# Patient Record
Sex: FEMALE | URBAN - METROPOLITAN AREA
[De-identification: names, ages, dates, MRNs, and addresses within clinical notes are randomized per-mention and may not be internally consistent; named-entity substitution may affect disease eponyms.]

---

## 2023-11-13 ENCOUNTER — LAB OUTSIDE AN ENCOUNTER (OUTPATIENT)
Dept: URBAN - METROPOLITAN AREA CLINIC 88 | Facility: CLINIC | Age: 68
End: 2023-11-13

## 2023-11-13 ENCOUNTER — OFFICE VISIT (OUTPATIENT)
Dept: URBAN - METROPOLITAN AREA CLINIC 88 | Facility: CLINIC | Age: 68
End: 2023-11-13
Payer: MEDICARE

## 2023-11-13 VITALS
DIASTOLIC BLOOD PRESSURE: 87 MMHG | TEMPERATURE: 96.8 F | SYSTOLIC BLOOD PRESSURE: 138 MMHG | HEIGHT: 61 IN | WEIGHT: 128.8 LBS | BODY MASS INDEX: 24.32 KG/M2 | HEART RATE: 72 BPM | OXYGEN SATURATION: 99 %

## 2023-11-13 DIAGNOSIS — K59.01 CONSTIPATION: ICD-10-CM

## 2023-11-13 DIAGNOSIS — K86.9 PANCREATIC LESION: ICD-10-CM

## 2023-11-13 DIAGNOSIS — R10.30 LOWER ABDOMINAL PAIN: ICD-10-CM

## 2023-11-13 DIAGNOSIS — R93.5 ABNORMAL CT OF THE ABDOMEN: ICD-10-CM

## 2023-11-13 PROBLEM — 35298007: Status: ACTIVE | Noted: 2023-11-13

## 2023-11-13 PROCEDURE — 99204 OFFICE O/P NEW MOD 45 MIN: CPT | Performed by: NURSE PRACTITIONER

## 2023-11-13 RX ORDER — ASPIRIN 81 MG/1
1 TABLET TABLET, COATED ORAL ONCE A DAY
Status: ACTIVE | COMMUNITY

## 2023-11-13 RX ORDER — BUPROPION HYDROCHLORIDE 300 MG/1
1 TABLET IN THE MORNING TABLET, EXTENDED RELEASE ORAL ONCE A DAY
Status: ACTIVE | COMMUNITY

## 2023-11-13 RX ORDER — LISINOPRIL 5 MG/1
1 TABLET TABLET ORAL ONCE A DAY
Status: ACTIVE | COMMUNITY

## 2023-11-13 RX ORDER — ATORVASTATIN CALCIUM 20 MG/1
1 TABLET TABLET, FILM COATED ORAL ONCE A DAY
Status: ACTIVE | COMMUNITY

## 2023-11-13 RX ORDER — METFORMIN HYDROCHLORIDE 500 MG/1
1 TABLET WITH A MEAL TABLET, FILM COATED ORAL ONCE A DAY
Status: ACTIVE | COMMUNITY

## 2023-11-13 RX ORDER — UBIQUINOL 100 MG
AS DIRECTED CAPSULE ORAL
Status: ACTIVE | COMMUNITY

## 2023-11-13 NOTE — HPI-TODAY'S VISIT:
Patient presents today for evaluation of abdominal pain.  Started to experience constant lower abdominal and back pain that started over 6 months ago.  Describes this pain as a tightness that appears to be worse in the mornings.  Experiences intermittent episodes of nausea, bloating, flatulence, diarrhea, intermittent dysphagia and reflux.  Describes her dysphagia as being sensation of food becoming lodged in mid-chest. Belching, drinking a carbonated beverage, ginger tea or taking antacid medication as needed helps to lessen symptoms.  Denies weight loss.  Aggravating factors:  certain foods and drinks (coffee, alcoholic beverages).  Alleviating:  dietary changes, takes anti-diarrhea medication as needed during diarrhea episodes.   Typically defecation occurs 1-2 times day after drinking morning coffee.  May not achive a complete sense of evacuation of stool with every BM.     CT A/P 10/20/2023:  pancreatic head with soft itssue focus at ampulla measuring 6 mm with mild diffuse promience of pancreatic duct and moderate colonic stool.  Other findings include normal liver, biliary duct and no ascites was observed.  Denies signs of jaundice, family hx of pancreatic cancer or weight loss.    Scheduled to undergo LT mastectomy next month (12/08/2023) due to genetic testing and family hx.

## 2023-11-22 ENCOUNTER — TELEPHONE ENCOUNTER (OUTPATIENT)
Dept: URBAN - METROPOLITAN AREA CLINIC 88 | Facility: CLINIC | Age: 68
End: 2023-11-22

## 2023-11-28 ENCOUNTER — LAB OUTSIDE AN ENCOUNTER (OUTPATIENT)
Dept: URBAN - METROPOLITAN AREA CLINIC 70 | Facility: CLINIC | Age: 68
End: 2023-11-28

## 2023-11-29 LAB
A/G RATIO: 1.8
ABSOLUTE BASOPHILS: 81
ABSOLUTE EOSINOPHILS: 171
ABSOLUTE LYMPHOCYTES: 1895
ABSOLUTE MONOCYTES: 432
ABSOLUTE NEUTROPHILS: 1922
ALBUMIN: 4.5
ALKALINE PHOSPHATASE: 84
ALT (SGPT): 18
AMYLASE: 83
AST (SGOT): 17
BASOPHILS: 1.8
BILIRUBIN, TOTAL: 0.7
BUN/CREATININE RATIO: (no result)
BUN: 15
CA 19-9: 33
CALCIUM: 9.6
CARBON DIOXIDE, TOTAL: 28
CHLORIDE: 106
CREATININE: 0.79
EGFR: 81
EOSINOPHILS: 3.8
GLOBULIN, TOTAL: 2.5
GLUCOSE: 88
HEMATOCRIT: 42.2
HEMOGLOBIN: 13.8
INR: 1.1
LIPASE: 34
LYMPHOCYTES: 42.1
MCH: 29.7
MCHC: 32.7
MCV: 90.9
MONOCYTES: 9.6
MPV: 9.2
NEUTROPHILS: 42.7
PLATELET COUNT: 300
POTASSIUM: 4.4
PROTEIN, TOTAL: 7
PT: 11.3
RDW: 13.4
RED BLOOD CELL COUNT: 4.64
SODIUM: 142
TSH W/REFLEX TO FT4: 2.99
WHITE BLOOD CELL COUNT: 4.5

## 2023-12-04 ENCOUNTER — OFFICE VISIT (OUTPATIENT)
Dept: URBAN - METROPOLITAN AREA CLINIC 88 | Facility: CLINIC | Age: 68
End: 2023-12-04

## 2023-12-04 RX ORDER — ASPIRIN 81 MG/1
1 TABLET TABLET, COATED ORAL ONCE A DAY
Status: ACTIVE | COMMUNITY

## 2023-12-04 RX ORDER — BUPROPION HYDROCHLORIDE 300 MG/1
1 TABLET IN THE MORNING TABLET, EXTENDED RELEASE ORAL ONCE A DAY
Status: ACTIVE | COMMUNITY

## 2023-12-04 RX ORDER — UBIQUINOL 100 MG
AS DIRECTED CAPSULE ORAL
Status: ACTIVE | COMMUNITY

## 2023-12-04 RX ORDER — ATORVASTATIN CALCIUM 20 MG/1
1 TABLET TABLET, FILM COATED ORAL ONCE A DAY
Status: ACTIVE | COMMUNITY

## 2023-12-04 RX ORDER — LISINOPRIL 5 MG/1
1 TABLET TABLET ORAL ONCE A DAY
Status: ACTIVE | COMMUNITY

## 2023-12-04 RX ORDER — METFORMIN HYDROCHLORIDE 500 MG/1
1 TABLET WITH A MEAL TABLET, FILM COATED ORAL ONCE A DAY
Status: ACTIVE | COMMUNITY

## 2023-12-05 ENCOUNTER — TELEPHONE ENCOUNTER (OUTPATIENT)
Dept: URBAN - METROPOLITAN AREA CLINIC 70 | Facility: CLINIC | Age: 68
End: 2023-12-05

## 2023-12-05 ENCOUNTER — LAB OUTSIDE AN ENCOUNTER (OUTPATIENT)
Dept: URBAN - METROPOLITAN AREA CLINIC 70 | Facility: CLINIC | Age: 68
End: 2023-12-05

## 2023-12-13 ENCOUNTER — DASHBOARD ENCOUNTERS (OUTPATIENT)
Age: 68
End: 2023-12-13

## 2023-12-13 ENCOUNTER — OFFICE VISIT (OUTPATIENT)
Dept: URBAN - METROPOLITAN AREA CLINIC 88 | Facility: CLINIC | Age: 68
End: 2023-12-13
Payer: MEDICARE

## 2023-12-13 VITALS
HEIGHT: 60 IN | WEIGHT: 129.8 LBS | BODY MASS INDEX: 25.48 KG/M2 | OXYGEN SATURATION: 97 % | SYSTOLIC BLOOD PRESSURE: 137 MMHG | TEMPERATURE: 98.1 F | DIASTOLIC BLOOD PRESSURE: 76 MMHG | HEART RATE: 80 BPM

## 2023-12-13 DIAGNOSIS — R93.5 ABNORMAL ABDOMINAL MRI: ICD-10-CM

## 2023-12-13 DIAGNOSIS — K86.89 PANCREATIC DUCT DILATED: ICD-10-CM

## 2023-12-13 DIAGNOSIS — K59.09 OTHER CONSTIPATION: ICD-10-CM

## 2023-12-13 DIAGNOSIS — Z80.0 FAMILY HISTORY OF COLON CANCER: ICD-10-CM

## 2023-12-13 PROCEDURE — 99214 OFFICE O/P EST MOD 30 MIN: CPT | Performed by: NURSE PRACTITIONER

## 2023-12-13 RX ORDER — METFORMIN HYDROCHLORIDE 500 MG/1
1 TABLET WITH A MEAL TABLET, FILM COATED ORAL ONCE A DAY
Status: ACTIVE | COMMUNITY

## 2023-12-13 RX ORDER — UBIQUINOL 100 MG
AS DIRECTED CAPSULE ORAL
Status: ACTIVE | COMMUNITY

## 2023-12-13 RX ORDER — BUPROPION HYDROCHLORIDE 300 MG/1
1 TABLET IN THE MORNING TABLET, EXTENDED RELEASE ORAL ONCE A DAY
Status: ACTIVE | COMMUNITY

## 2023-12-13 RX ORDER — ASPIRIN 81 MG/1
1 TABLET TABLET, COATED ORAL ONCE A DAY
Status: ACTIVE | COMMUNITY

## 2023-12-13 RX ORDER — LISINOPRIL 5 MG/1
1 TABLET TABLET ORAL ONCE A DAY
Status: ACTIVE | COMMUNITY

## 2023-12-13 RX ORDER — ATORVASTATIN CALCIUM 20 MG/1
1 TABLET TABLET, FILM COATED ORAL ONCE A DAY
Status: ACTIVE | COMMUNITY

## 2023-12-13 RX ORDER — CEPHALEXIN 500 MG/1
1 CAPSULE CAPSULE ORAL
Status: ACTIVE | COMMUNITY

## 2023-12-13 RX ORDER — TRAMADOL HYDROCHLORIDE 50 MG/1
1 TABLET AS NEEDED TABLET, FILM COATED ORAL ONCE A DAY
Status: ACTIVE | COMMUNITY

## 2023-12-13 NOTE — HPI-TODAY'S VISIT:
Presents today for follow up regarding abdominal pain and to discuss MRI Abdomen results.  MRI Abd w/wo ordered due to abnormal CT A/P findings concerning for pancreatic mass.  MRI on 11/28/2023 revealed no discrete pancreatic mass.  Borderline prominence of main pancreatic duct in head near ampulla measuring 4 mm.  FIndings concerning for ansa pancreatica.  CT A/P 10/20/2023:  pancreatic head with soft itssue focus at ampulla measuring 6 mm with mild diffuse promience of pancreatic duct and moderate colonic stool.  Other findings include normal liver, biliary duct and no ascites was observed.  Denies signs of jaundice, family hx of pancreatic cancer or weight loss.  She is currently scheduled for EUS procedure on 01/12/2023 at [facility] to further evaluate above findings.  Feels lower abdominal pain is less but still present.  Underwent left mastectomy on 12/01/2023 due to genetic testing and family hx of breast cancer.  Since surgery, reports onset of constipation and abdominal bloating.  Fails to experience a complete sense of evacuation of stool.  Denies taking medication to avoid constipation.

## 2023-12-13 NOTE — PHYSICAL EXAM GASTROINTESTINAL
Abdomen, soft, nontender, slightly protruding ABRAHAM drain in place, no guarding or rigidity, no masses palpable, normal bowel sounds, Liver and Spleen:  no hepatosplenomegaly

## 2023-12-13 NOTE — HPI-OTHER HISTORIES
----------------------------------------------------------------------- Last office note 11/13/2023: Patient presents today for evaluation of abdominal pain.  Started to experience constant lower abdominal and back pain that started over 6 months ago.  Describes this pain as a tightness that appears to be worse in the mornings.  Experiences intermittent episodes of nausea, bloating, flatulence, diarrhea, intermittent dysphagia and reflux.  Describes her dysphagia as being sensation of food becoming lodged in mid-chest. Belching, drinking a carbonated beverage, ginger tea or taking antacid medication as needed helps to lessen symptoms.  Denies weight loss.  Aggravating factors:  certain foods and drinks (coffee, alcoholic beverages).  Alleviating:  dietary changes, takes anti-diarrhea medication as needed during diarrhea episodes.   Typically defecation occurs 1-2 times day after drinking morning coffee.  May not achive a complete sense of evacuation of stool with every BM.     CT A/P 10/20/2023:  pancreatic head with soft itssue focus at ampulla measuring 6 mm with mild diffuse promience of pancreatic duct and moderate colonic stool.  Other findings include normal liver, biliary duct and no ascites was observed.  Denies signs of jaundice, family hx of pancreatic cancer or weight loss.    Scheduled to undergo LT mastectomy next month (12/08/2023) due to genetic testing and family hx.

## 2024-01-12 ENCOUNTER — OFFICE VISIT (OUTPATIENT)
Dept: URBAN - METROPOLITAN AREA MEDICAL CENTER 28 | Facility: MEDICAL CENTER | Age: 69
End: 2024-01-12

## 2024-01-24 ENCOUNTER — OFFICE VISIT (OUTPATIENT)
Dept: URBAN - METROPOLITAN AREA CLINIC 88 | Facility: CLINIC | Age: 69
End: 2024-01-24